# Patient Record
Sex: MALE | Race: WHITE | Employment: FULL TIME | URBAN - METROPOLITAN AREA
[De-identification: names, ages, dates, MRNs, and addresses within clinical notes are randomized per-mention and may not be internally consistent; named-entity substitution may affect disease eponyms.]

---

## 2017-12-08 RX ORDER — ROSUVASTATIN CALCIUM 10 MG/1
TABLET, COATED ORAL
Qty: 90 TABLET | Refills: 2 | Status: SHIPPED | OUTPATIENT
Start: 2017-12-08 | End: 2018-09-13 | Stop reason: SDUPTHER

## 2018-01-03 LAB
CHOLESTEROL, TOTAL: 139 MG/DL (ref 0–199)
HDLC SERPL-MCNC: 43 MG/DL (ref 40–60)
LDL CHOLESTEROL CALCULATED: 63 MG/DL
TRIGL SERPL-MCNC: 166 MG/DL (ref 0–150)
VLDLC SERPL CALC-MCNC: 33 MG/DL

## 2018-01-15 ENCOUNTER — OFFICE VISIT (OUTPATIENT)
Dept: ENDOCRINOLOGY | Age: 53
End: 2018-01-15

## 2018-01-15 VITALS
SYSTOLIC BLOOD PRESSURE: 143 MMHG | WEIGHT: 245.2 LBS | HEIGHT: 71 IN | RESPIRATION RATE: 16 BRPM | DIASTOLIC BLOOD PRESSURE: 88 MMHG | BODY MASS INDEX: 34.33 KG/M2 | OXYGEN SATURATION: 99 % | HEART RATE: 72 BPM

## 2018-01-15 DIAGNOSIS — R73.01 IFG (IMPAIRED FASTING GLUCOSE): ICD-10-CM

## 2018-01-15 DIAGNOSIS — E78.49 OTHER HYPERLIPIDEMIA: Primary | ICD-10-CM

## 2018-01-15 PROCEDURE — G8484 FLU IMMUNIZE NO ADMIN: HCPCS | Performed by: INTERNAL MEDICINE

## 2018-01-15 PROCEDURE — 99213 OFFICE O/P EST LOW 20 MIN: CPT | Performed by: INTERNAL MEDICINE

## 2018-01-15 PROCEDURE — 1036F TOBACCO NON-USER: CPT | Performed by: INTERNAL MEDICINE

## 2018-01-15 PROCEDURE — G8417 CALC BMI ABV UP PARAM F/U: HCPCS | Performed by: INTERNAL MEDICINE

## 2018-01-15 PROCEDURE — G8427 DOCREV CUR MEDS BY ELIG CLIN: HCPCS | Performed by: INTERNAL MEDICINE

## 2018-01-15 PROCEDURE — 3017F COLORECTAL CA SCREEN DOC REV: CPT | Performed by: INTERNAL MEDICINE

## 2018-01-15 NOTE — PROGRESS NOTES
Subjective:      47 y/o WM , seen for cholesterol evaluation. Patient of Dr. Sabrina Nick. Hyperlipidemia for 10 years    Takes crestor 10mg daily    12/15   LDL 62      A1c 5.7  C-peptide 4.9H  Vit D 62    Vitamin 4000 IU daily    H/o CKD , Cr 1.3, followed by PCP    No h/o CAD, CVA    H/o impaired fasting    11/16  LDL 60 Vit D 61  1/18  LDL 63     FH: Adopted as child    Past Medical History:   Diagnosis Date    Arrhythmia     CKD (chronic kidney disease) 8/14/2013    Hyperlipidemia      Past Surgical History:   Procedure Laterality Date    APPENDECTOMY       Current Outpatient Prescriptions   Medication Sig Dispense Refill    rosuvastatin (CRESTOR) 10 MG tablet TAKE 1 TABLET BY MOUTH AT BEDTIME 90 tablet 2    LUTEIN-ZEAXANTHIN PO Take 8 mg by mouth 2 times daily.  Cholecalciferol (VITAMIN D) 2000 UNIT CAPS capsule Take 4,000 Units by mouth daily. No current facility-administered medications for this visit. Review of Systems  Please see scanned     Objective: There were no vitals taken for this visit. Wt Readings from Last 3 Encounters:   11/28/16 236 lb 9.6 oz (107.3 kg)   12/18/15 240 lb (108.9 kg)   06/19/15 231 lb (104.8 kg)       Constitutional: Well-developed, appears stated age, cooperative, in no acute distress  H/E/N/M/T:atraumatic, normocephalic, external ears, nose, lips normal without lesions  Eyes: Lids, lashes, conjunctivae and sclerae normal  Neck: supple, symmetrical, trachea midline   Skin and hair texture normal  Psychiatric: Judgement and Insight:  judgement and insight appear normal  Neuro: Normal without focal findings, speech is normal normal, speech is spontaneous    Lab Review  No results found for: TSH  No results found for: FREET4      Assessment: 1. Hyperlipidemia: LDL at goal, controlled. On crestor  2. Prediabetes: recommend CHO restriction  3. CKD III: Sees   4. Elevated BP : Advised low salt diet, see PCP    Plan: 1. Continue

## 2018-09-13 RX ORDER — ROSUVASTATIN CALCIUM 10 MG/1
TABLET, COATED ORAL
Qty: 90 TABLET | Refills: 3 | Status: SHIPPED | OUTPATIENT
Start: 2018-09-13